# Patient Record
Sex: MALE | Race: WHITE | URBAN - METROPOLITAN AREA
[De-identification: names, ages, dates, MRNs, and addresses within clinical notes are randomized per-mention and may not be internally consistent; named-entity substitution may affect disease eponyms.]

---

## 2017-04-13 ENCOUNTER — TELEPHONE (OUTPATIENT)
Dept: OTHER | Facility: CLINIC | Age: 37
End: 2017-04-13

## 2017-04-13 NOTE — TELEPHONE ENCOUNTER
Call Regarding Onboarding [facility] choices    Attempt 1    Message     Comments: patient was very upset that we were calling him. Thinks we are a [facility]      Outreach   Allyson Zavala

## 2018-01-23 ENCOUNTER — THERAPY VISIT (OUTPATIENT)
Dept: PHYSICAL THERAPY | Facility: CLINIC | Age: 38
End: 2018-01-23
Payer: COMMERCIAL

## 2018-01-23 ENCOUNTER — OFFICE VISIT (OUTPATIENT)
Dept: FAMILY MEDICINE | Facility: CLINIC | Age: 38
End: 2018-01-23
Payer: COMMERCIAL

## 2018-01-23 ENCOUNTER — RADIANT APPOINTMENT (OUTPATIENT)
Dept: GENERAL RADIOLOGY | Facility: CLINIC | Age: 38
End: 2018-01-23
Attending: NURSE PRACTITIONER
Payer: COMMERCIAL

## 2018-01-23 VITALS
TEMPERATURE: 98.2 F | OXYGEN SATURATION: 97 % | BODY MASS INDEX: 24.85 KG/M2 | HEART RATE: 80 BPM | SYSTOLIC BLOOD PRESSURE: 143 MMHG | HEIGHT: 70 IN | RESPIRATION RATE: 16 BRPM | DIASTOLIC BLOOD PRESSURE: 83 MMHG | WEIGHT: 173.6 LBS

## 2018-01-23 DIAGNOSIS — M54.50 ACUTE RIGHT-SIDED LOW BACK PAIN WITHOUT SCIATICA: Primary | ICD-10-CM

## 2018-01-23 PROCEDURE — 72100 X-RAY EXAM L-S SPINE 2/3 VWS: CPT

## 2018-01-23 PROCEDURE — 97110 THERAPEUTIC EXERCISES: CPT | Mod: GP | Performed by: PHYSICAL THERAPIST

## 2018-01-23 PROCEDURE — 97161 PT EVAL LOW COMPLEX 20 MIN: CPT | Mod: GP | Performed by: PHYSICAL THERAPIST

## 2018-01-23 PROCEDURE — G8982 BODY POS GOAL STATUS: HCPCS | Mod: GP | Performed by: PHYSICAL THERAPIST

## 2018-01-23 PROCEDURE — 99203 OFFICE O/P NEW LOW 30 MIN: CPT | Performed by: NURSE PRACTITIONER

## 2018-01-23 PROCEDURE — G8981 BODY POS CURRENT STATUS: HCPCS | Mod: GP | Performed by: PHYSICAL THERAPIST

## 2018-01-23 ASSESSMENT — PATIENT HEALTH QUESTIONNAIRE - PHQ9: SUM OF ALL RESPONSES TO PHQ QUESTIONS 1-9: 7

## 2018-01-23 NOTE — LETTER
February 1, 2018      Sukumartayo Stephens  [address]   [facility] [address]        Dear ,    We are writing to inform you of your test results.    The radiologist did not find anything abnormal on your xrays.     I hope you are feeling better.   Please let me know if you have any questions.     Resulted Orders   XR Lumbar Spine 2/3 Views    Narrative    LUMBAR SPINE TWO TO THREE VIEWS   1/23/2018 8:28 AM     HISTORY:  Acute right-sided low back pain without sciatica.      Impression    IMPRESSION: Minimal degenerative disc disease changes at L5-S1.  Otherwise unremarkable.    ALVERTO CRUZ MD       If you have any questions or concerns, please call the clinic at the number listed above.       Sincerely,        BIJAN Gaytan CNP/nr

## 2018-01-23 NOTE — MR AVS SNAPSHOT
"              After Visit Summary   1/23/2018    Sukumar Stephens    MRN: [id number]           Patient Information     Date Of Birth          1980        Visit Information        Provider Department      1/23/2018 2:40 PM Daron Lopez, PT [facility] Physical Therapy        Today's Diagnoses     Acute right-sided low back pain without sciatica    -  1       Follow-ups after your visit        Your next 10 appointments already scheduled     Feb 08, 2018  2:00 PM CST   PRITESH Spine with [facility]   [facility] ([facility]  )    [address] [place] [id number] [phone]              Who to contact     If you have questions or need follow up information about today's clinic visit or your schedule please contact [facility] PHYSICAL THERAPY directly at [phone].  Normal or non-critical lab and imaging results will be communicated to you by [employer], letter or phone within 4 business days after the clinic has received the results. If you do not hear from us within 7 days, please contact the clinic through [employer] or phone. If you have a critical or abnormal lab result, we will notify you by phone as soon as possible.  Submit refill requests through [employer] or call your pharmacy and they will forward the refill request to us. Please allow 3 business days for your refill to be completed.          Additional Information About Your Visit        [employer] Information     Ulm[employer] lets you send messages to your doctor, view your test results, renew your prescriptions, schedule appointments and more. To sign up, go to [email or web address] . Click on \"Log in\" on the left side of the screen, which will take you to the Welcome page. Then click on \"Sign up Now\" on the right side of the page.     You will be asked to enter the access code listed below, as well as some " personal information. Please follow the directions to create your username and password.     Your access code is: [email or web address]  Expires: 2018  8:44 AM     Your access code will  in 90 days. If you need help or a new code, please call your [place] clinic or [phone].        Care EveryWhere ID     This is your Care EveryWhere ID. This could be used by other organizations to access your [place] medical records  [id number]         Blood Pressure from Last 3 Encounters:   18 143/83   13 112/72   13 120/78    Weight from Last 3 Encounters:   18 78.7 kg (173 lb 9.6 oz)   13 81 kg (178 lb 8 oz)   13 78.9 kg (174 lb)              We Performed the Following     PRITESH Inital Eval Report     PT Eval, Low Complexity (91288)     Therapeutic Exercises        Primary Care Provider Office Phone # Fax #    Wolfgang Murillo PA-C [phone]       [facility] [address] [facility] [address]        Equal Access to Services     [facility] : Hadii aad ku hadasho Soomaali, waaxda luqadaha, qaybta kaalmada adeegyada, romulo munozn reggie de la torre . So [facility] [phone].    ATENCIÓN: Si habla español, tiene a hoskins disposición servicios gratuitos de asistencia lingüística. [facility] [phone].    We comply with applicable federal civil rights laws and [place] laws. We do not discriminate on the basis of race, color, national origin, age, disability, sex, sexual orientation, or gender identity.            Thank you!     Thank you for choosing [facility] PHYSICAL THERAPY  for your care. Our goal is always to provide you with excellent care. Hearing back from our patients is one way we can continue to improve our services. Please take a few minutes to complete the written survey that you may receive in the mail after your visit with us. Thank you!             Your Updated Medication List - Protect others around  you: Learn how to safely use, store and throw away your medicines at www.disposemymeds.org.          This list is accurate as of 1/23/18 11:59 PM.  Always use your most recent med list.                   Brand Name Dispense Instructions for use Diagnosis    aspirin-acetaminophen-caffeine 250-250-65 MG per tablet    EXCEDRIN MIGRAINE     Take 1 tablet by mouth every 6 hours as needed.        buPROPion 300 MG 24 hr tablet    WELLBUTRIN XL    30 tablet    Take 1 tablet by mouth every morning.    Moderate major depression (H)       Calcium-Magnesium-Zinc Tabs      Take  by mouth. 1 tablet twice daily    Moderate major depression (H)       cetirizine 10 MG tablet    zyrTEC     Take 10 mg by mouth daily as needed. Takes regularly from July to September when allergies are bad        diphenhydrAMINE-acetaminophen  MG tablet    TYLENOL PM     Take 2 tablets by mouth nightly as needed.        MULTIVITAMIN & MINERAL PO      Take  by mouth. 1 tablet twice daily    Moderate major depression (H)       OMEGA 3 PO      Take  by mouth. 1 tablet po daily    CARDIOVASCULAR SCREENING; LDL GOAL LESS THAN 160       PSEUDOEPHEDRINE HCL PO      Take 60 mg by mouth 2 times daily as needed.        traZODone 50 MG tablet    DESYREL    30 tablet    Take 1-2 tablets by mouth nightly as needed for sleep.    Insomnia       triamcinolone 0.1 % cream    KENALOG    30 g    Apply to affected area twice a  day.  Do not use for more than 10 days at one time.  Stop using after 10 days. Apply sparingly to affected area.    Seborrheic dermatitis       TYLENOL PO      Take 1-2 tablets by mouth every 8 hours as needed.        VITAMIN B COMPLEX PO      Take  by mouth. 1 tablet daily    Moderate major depression (H)       VITAMIN C PO      Take  by mouth. 2 tablets daily    Moderate major depression (H)       VITAMIN D (CHOLECALCIFEROL) PO      Take  by mouth daily. 1 tablet daily    Moderate major depression (H)

## 2018-01-23 NOTE — MR AVS SNAPSHOT
After Visit Summary   1/23/2018    Sukumar Stephens    MRN: [id number]           Patient Information     Date Of Birth          1980        Visit Information        Provider Department      1/23/2018 7:40 AM Shavonne Mckenzie APRN CNP [facility]        Today's Diagnoses     Acute right-sided low back pain without sciatica    -  1       Follow-ups after your visit        Additional Services     PRITESH PT, HAND, AND CHIROPRACTIC REFERRAL       **This order will print in the [facility] Scheduling Office**    Physical Therapy, Hand Therapy and Chiropractic Care are available through:    *[facility]  *[facility]  *[facility]    Call one number to schedule at any of the above locations: [phone].    Your provider has referred you to: Physical Therapy at [facility] or [facility]    Indication/Reason for Referral: Low Back Pain  Onset of Illness: years  Therapy Orders: Evaluate and Treat  Special Programs: None  Special Request: None    May Olvera      Additional Comments for the Therapist or Chiropractor: leg length discrepancy causing low back pain    Please be aware that coverage of these services is subject to the terms and limitations of your health insurance plan.  Call member services at your health plan with any benefit or coverage questions.      Please bring the following to your appointment:    *Your personal calendar for scheduling future appointments  *Comfortable clothing                  Who to contact     If you have questions or need follow up information about today's clinic visit or your schedule please contact [facility] directly at [phone].  Normal or non-critical lab and imaging results will be communicated to you by MyChart, letter or phone within 4 business days after the clinic has received the results. If you do not hear from us within 7 days, please contact the clinic through [employer] or  "phone. If you have a critical or abnormal lab result, we will notify you by phone as soon as possible.  Submit refill requests through [employer] or call your pharmacy and they will forward the refill request to us. Please allow 3 business days for your refill to be completed.          Additional Information About Your Visit        [employer] Information     [employer] lets you send messages to your doctor, view your test results, renew your prescriptions, schedule appointments and more. To sign up, go to [email or web address] . Click on \"Log in\" on the left side of the screen, which will take you to the Welcome page. Then click on \"Sign up Now\" on the right side of the page.     You will be asked to enter the access code listed below, as well as some personal information. Please follow the directions to create your username and password.     Your access code is: [email or web address]  Expires: 2018  8:44 AM     Your access code will  in 90 days. If you need help or a new code, please call your [place] clinic or [phone].        Care EveryWhere ID     This is your Care EveryWhere ID. This could be used by other organizations to access your [place] medical records  [id number]        Your Vitals Were     Pulse Temperature Respirations Height Pulse Oximetry BMI (Body Mass Index)    80 98.2  F (36.8  C) (Oral) 16 5' 10\" (1.778 m) 97% 24.91 kg/m2       Blood Pressure from Last 3 Encounters:   18 143/83   13 112/72   13 120/78    Weight from Last 3 Encounters:   18 173 lb 9.6 oz (78.7 kg)   13 178 lb 8 oz (81 kg)   13 174 lb (78.9 kg)              We Performed the Following     PRITESH PT, HAND, AND CHIROPRACTIC REFERRAL     XR Lumbar Spine 2/3 Views        Primary Care Provider Office Phone # Fax #    Wolfgang Murillo PA-C [phone]       [facility] [address] [facility] [address]        Equal Access to Services     NEPTALI MARCH AH: Saul hofmfan " Sobenjamín, wasatishda luqadaha, qaybta kaalmada nani, romulo paredes gloriaoneil juaressharif deirdre. So [facility] [phone].    ATENCIÓN: Si jose vang, tiene a hoskins disposición servicios gratuitos de asistencia lingüística. Tana al [phone].    We comply with applicable federal civil rights laws and [place] laws. We do not discriminate on the basis of race, color, national origin, age, disability, sex, sexual orientation, or gender identity.            Thank you!     Thank you for choosing [facility]  for your care. Our goal is always to provide you with excellent care. Hearing back from our patients is one way we can continue to improve our services. Please take a few minutes to complete the written survey that you may receive in the mail after your visit with us. Thank you!             Your Updated Medication List - Protect others around you: Learn how to safely use, store and throw away your medicines at www.disposemymeds.org.          This list is accurate as of: 1/23/18  8:44 AM.  Always use your most recent med list.                   Brand Name Dispense Instructions for use Diagnosis    aspirin-acetaminophen-caffeine 250-250-65 MG per tablet    EXCEDRIN MIGRAINE     Take 1 tablet by mouth every 6 hours as needed.        buPROPion 300 MG 24 hr tablet    WELLBUTRIN XL    30 tablet    Take 1 tablet by mouth every morning.    Moderate major depression (H)       Calcium-Magnesium-Zinc Tabs      Take  by mouth. 1 tablet twice daily    Moderate major depression (H)       cetirizine 10 MG tablet    zyrTEC     Take 10 mg by mouth daily as needed. Takes regularly from July to September when allergies are bad        diphenhydrAMINE-acetaminophen  MG tablet    TYLENOL PM     Take 2 tablets by mouth nightly as needed.        MULTIVITAMIN & MINERAL PO      Take  by mouth. 1 tablet twice daily    Moderate major depression (H)       OMEGA 3 PO      Take  by mouth. 1 tablet po daily     CARDIOVASCULAR SCREENING; LDL GOAL LESS THAN 160       PSEUDOEPHEDRINE HCL PO      Take 60 mg by mouth 2 times daily as needed.        traZODone 50 MG tablet    DESYREL    30 tablet    Take 1-2 tablets by mouth nightly as needed for sleep.    Insomnia       triamcinolone 0.1 % cream    KENALOG    30 g    Apply to affected area twice a  day.  Do not use for more than 10 days at one time.  Stop using after 10 days. Apply sparingly to affected area.    Seborrheic dermatitis       TYLENOL PO      Take 1-2 tablets by mouth every 8 hours as needed.        VITAMIN B COMPLEX PO      Take  by mouth. 1 tablet daily    Moderate major depression (H)       VITAMIN C PO      Take  by mouth. 2 tablets daily    Moderate major depression (H)       VITAMIN D (CHOLECALCIFEROL) PO      Take  by mouth daily. 1 tablet daily    Moderate major depression (H)

## 2018-01-23 NOTE — PROGRESS NOTES
SUBJECTIVE:   Sukumar Stephens is a 37 year old male who presents to clinic today for the following health issues:        Back Pain       Duration: 2 weeks        Specific cause: none    Description:   Location of pain: low back right  Character of pain: dull ache  Pain radiation:none  New numbness or weakness in legs, not attributed to pain:  no     Intensity: Currently 3/10    History:   Pain interferes with job: YES  History of back problems: no prior back problems  Therapies tried without relief: Ibuprofen    Alleviating factors:   Improved by: ibuprofen       Precipitating factors:  Worsened by: Sitting    Accompanying Signs & Symptoms:  Risk of Fracture:  None  Risk of Cauda Equina:  None  Risk of Infection:  None  Risk of Cancer:  None  Risk of Ankylosing Spondylitis:  Onset at age <35, male, AND morning back stiffness. no     Red flag symptoms: negative.  Has known leg length descrepancy - normally wears an orthotic on the right shoe.  Typically this can cause left low back pain and he regularly does his stretches to prevent this.    This right sided pain is different than his normal low back pain.      Past Medical History:   Diagnosis Date     CARDIOVASCULAR SCREENING; LDL GOAL LESS THAN 160      Insomnia 4/12/2013     Moderate major depression (H) 4/12/2013     Substance abuse 10/27/2012    Alcohol - Quit drinking      Current Outpatient Prescriptions   Medication Sig Dispense Refill     cetirizine (ZYRTEC) 10 MG tablet Take 10 mg by mouth daily as needed. Takes regularly from July to September when allergies are bad       buPROPion (WELLBUTRIN XL) 300 MG 24 hr tablet Take 1 tablet by mouth every morning. (Patient not taking: Reported on 1/23/2018) 30 tablet 6     triamcinolone (KENALOG) 0.1 % cream Apply to affected area twice a  day.  Do not use for more than 10 days at one time.  Stop using after 10 days.  Apply sparingly to affected area. (Patient not taking: Reported on 1/23/2018) 30 g 0     B  "Complex Vitamins (VITAMIN B COMPLEX PO) Take  by mouth. 1 tablet daily       Multiple Minerals-Vitamins (CALCIUM-MAGNESIUM-ZINC) TABS Take  by mouth. 1 tablet twice daily       Multiple Vitamins-Minerals (MULTIVITAMIN & MINERAL PO) Take  by mouth. 1 tablet twice daily       Ascorbic Acid (VITAMIN C PO) Take  by mouth. 2 tablets daily       Omega-3 Fatty Acids (OMEGA 3 PO) Take  by mouth. 1 tablet po daily       VITAMIN D, CHOLECALCIFEROL, PO Take  by mouth daily. 1 tablet daily       traZODone (DESYREL) 50 MG tablet Take 1-2 tablets by mouth nightly as needed for sleep. (Patient not taking: Reported on 1/23/2018) 30 tablet 6     diphenhydrAMINE-acetaminophen (TYLENOL PM)  MG tablet Take 2 tablets by mouth nightly as needed.       PSEUDOEPHEDRINE HCL PO Take 60 mg by mouth 2 times daily as needed.       aspirin-acetaminophen-caffeine (EXCEDRIN MIGRAINE) 250-250-65 MG per tablet Take 1 tablet by mouth every 6 hours as needed.       Acetaminophen (TYLENOL PO) Take 1-2 tablets by mouth every 8 hours as needed.       Social History   Substance Use Topics     Smoking status: Never Smoker     Smokeless tobacco: Never Used     Alcohol use No      Comment: Pt states drinks 2-3 liters per week stopped drinkin in Octoberg this past Sunday.       ROS:  Review of systems negative except as stated above.    OBJECTIVE:  /83  Pulse 80  Temp 98.2  F (36.8  C) (Oral)  Resp 16  Ht 5' 10\" (1.778 m)  Wt 173 lb 9.6 oz (78.7 kg)  SpO2 97%  BMI 24.91 kg/m2  Back examination: Back symmetric, no curvature. ROM normal. No CVA tenderness.  [unfilled] leg raise test: negative  GENERAL APPEARANCE: healthy, alert and no distress  RESP: lungs clear to auscultation - no rales, rhonchi or wheezes  CV: regular rates and rhythm, normal S1 S2, no murmur noted  ABDOMEN:  soft, nontender, no HSM or masses and bowel sounds normal  NEURO: Normal strength and tone with no weakness or sensory deficit noted, reflexes normal   SKIN: no " suspicious lesions or rashes    Xray interpreted as negative in the clinic.  Pending radiologist review.        ASSESSMENT/IMPRESSION:  myofascial low back strain    PLAN:1) PLEASE SEE ORDER SUMMARY  [unfilled]:  Refer to PT for target exercises.    Recommend he get refitted for orthotics      1.  Continue stretching and strengthening exercises.       2.  Continue prn heat or ice application.

## 2018-01-24 PROBLEM — M54.50 ACUTE RIGHT-SIDED LOW BACK PAIN WITHOUT SCIATICA: Status: ACTIVE | Noted: 2018-01-24

## 2018-02-08 ENCOUNTER — THERAPY VISIT (OUTPATIENT)
Dept: PHYSICAL THERAPY | Facility: CLINIC | Age: 38
End: 2018-02-08
Payer: COMMERCIAL

## 2018-02-08 DIAGNOSIS — M54.50 ACUTE RIGHT-SIDED LOW BACK PAIN WITHOUT SCIATICA: ICD-10-CM

## 2018-02-08 PROCEDURE — 97112 NEUROMUSCULAR REEDUCATION: CPT | Mod: GP | Performed by: PHYSICAL THERAPIST

## 2018-02-08 PROCEDURE — 97140 MANUAL THERAPY 1/> REGIONS: CPT | Mod: GP | Performed by: PHYSICAL THERAPIST

## 2018-02-08 PROCEDURE — 97110 THERAPEUTIC EXERCISES: CPT | Mod: GP | Performed by: PHYSICAL THERAPIST

## 2018-02-22 ENCOUNTER — THERAPY VISIT (OUTPATIENT)
Dept: PHYSICAL THERAPY | Facility: CLINIC | Age: 38
End: 2018-02-22
Payer: COMMERCIAL

## 2018-02-22 DIAGNOSIS — M54.50 ACUTE RIGHT-SIDED LOW BACK PAIN WITHOUT SCIATICA: ICD-10-CM

## 2018-02-22 PROCEDURE — 97140 MANUAL THERAPY 1/> REGIONS: CPT | Mod: GP | Performed by: PHYSICAL THERAPIST

## 2018-02-22 PROCEDURE — 97112 NEUROMUSCULAR REEDUCATION: CPT | Mod: GP | Performed by: PHYSICAL THERAPIST

## 2018-02-22 PROCEDURE — 97110 THERAPEUTIC EXERCISES: CPT | Mod: GP | Performed by: PHYSICAL THERAPIST

## 2018-03-08 ENCOUNTER — THERAPY VISIT (OUTPATIENT)
Dept: PHYSICAL THERAPY | Facility: CLINIC | Age: 38
End: 2018-03-08
Payer: COMMERCIAL

## 2018-03-08 DIAGNOSIS — M54.50 ACUTE RIGHT-SIDED LOW BACK PAIN WITHOUT SCIATICA: ICD-10-CM

## 2018-03-08 PROCEDURE — 97110 THERAPEUTIC EXERCISES: CPT | Mod: GP | Performed by: PHYSICAL THERAPIST

## 2018-03-08 PROCEDURE — 97112 NEUROMUSCULAR REEDUCATION: CPT | Mod: GP | Performed by: PHYSICAL THERAPIST

## 2018-03-08 PROCEDURE — 97140 MANUAL THERAPY 1/> REGIONS: CPT | Mod: GP | Performed by: PHYSICAL THERAPIST

## 2018-09-26 ENCOUNTER — OFFICE VISIT (OUTPATIENT)
Dept: FAMILY MEDICINE | Facility: CLINIC | Age: 38
End: 2018-09-26
Payer: OTHER MISCELLANEOUS

## 2018-09-26 VITALS
SYSTOLIC BLOOD PRESSURE: 136 MMHG | BODY MASS INDEX: 24.34 KG/M2 | DIASTOLIC BLOOD PRESSURE: 88 MMHG | HEIGHT: 70 IN | RESPIRATION RATE: 14 BRPM | WEIGHT: 170 LBS | OXYGEN SATURATION: 98 % | HEART RATE: 68 BPM | TEMPERATURE: 98.3 F

## 2018-09-26 DIAGNOSIS — Z23 NEED FOR PROPHYLACTIC VACCINATION AND INOCULATION AGAINST INFLUENZA: ICD-10-CM

## 2018-09-26 DIAGNOSIS — T14.8XXA ABRASION OF SKIN: Primary | ICD-10-CM

## 2018-09-26 PROCEDURE — 90715 TDAP VACCINE 7 YRS/> IM: CPT | Performed by: FAMILY MEDICINE

## 2018-09-26 PROCEDURE — 99213 OFFICE O/P EST LOW 20 MIN: CPT | Mod: 25 | Performed by: FAMILY MEDICINE

## 2018-09-26 PROCEDURE — 90471 IMMUNIZATION ADMIN: CPT | Performed by: FAMILY MEDICINE

## 2018-09-26 PROCEDURE — 90686 IIV4 VACC NO PRSV 0.5 ML IM: CPT | Performed by: FAMILY MEDICINE

## 2018-09-26 PROCEDURE — 90472 IMMUNIZATION ADMIN EACH ADD: CPT | Performed by: FAMILY MEDICINE

## 2018-09-26 RX ORDER — CEPHALEXIN 500 MG/1
500 CAPSULE ORAL 3 TIMES DAILY
Qty: 9 CAPSULE | Refills: 0 | Status: SHIPPED | OUTPATIENT
Start: 2018-09-26 | End: 2018-09-29

## 2018-09-26 NOTE — PROGRESS NOTES
"  SUBJECTIVE:   Sukumar tSephens is a 38 year old male who presents to clinic today for the following health issues:      WOUND CARE/LACERATION    Onset: 3 hour(s) ago     Description:   Location: right fore arm-extensor carpi ulnaris muscle area. Length 5\", superficial     History of how wound occured:    Changing air conditioner. Corby metal and unsanitized water present.    Current dressing/therapies tried :   Patient cleaned laceration with alcohol and dressed wit band-aids    Accompanying Signs and Symptoms:        Actively bleeding: no        Redness: YES        Warmth: no        Drainage: no        Able to move area: YES        Numbness and/or tingling: no        Fevers: no    Wound Care needs today:         Wound care: removal of existing dressing  visual inspection  cleansing with Exidine -4 CHG 4% solution  application of sterile dressing          Tetanus vaccination status reviewed:  Td vaccination indicated and given today.    Injured at work, scraped with air conditioner with stagnant and dirty water.         EXAM:  /88 (BP Location: Left arm, Patient Position: Sitting)  Pulse 68  Temp 98.3  F (36.8  C) (Oral)  Resp 14  Ht 5' 10\" (1.778 m)  Wt 170 lb (77.1 kg)  SpO2 98%  BMI 24.39 kg/m2  Constitutional: Healthy, alert, no distress   Skin: approx 20cm superficial abrasion with some surrounding erythema, minimal tenderness    ASSESSMENT    ICD-10-CM    1. Abrasion of skin T14.8XXA cephALEXin (KEFLEX) 500 MG capsule     TDAP, IM (10 - 64 YRS) - Adacel     Vaccine Administration, Each Additional [63543]   2. Need for prophylactic vaccination and inoculation against influenza Z23 FLU VACCINE, SPLIT VIRUS, IM (QUADRIVALENT) [41447]- >3 YRS     Vaccine Administration, Initial [92139]      Plan:  Recommending antibiotics given description of mechanism of injury with dirty water.    Follow-up in 1 week to resolve this work related encounter if necessary from a work comp perspective, sooner if not " resolving or if redness, drainage develops. Keep clean and dry.    Ang Jacobs MD  Family Medicine Physician

## 2018-09-26 NOTE — NURSING NOTE
Wound Care needs today:         Wound care: removal of existing dressing  visual inspection  cleansing with Exidine -4 CHG 4% solution  application of sterile dressing   Wound care instructions given.      Frank Do MA on 9/26/2018 at 12:43 PM

## 2018-09-26 NOTE — NURSING NOTE
Prior to injection verified patient identity using patient's name and date of birth.  Due to injection administration, patient instructed to remain in clinic for 15 minutes  afterwards, and to report any adverse reaction to me immediately. Vaccine information supplied.      Injectable Influenza Immunization Documentation    1.  Is the person to be vaccinated sick today?   No    2. Does the person to be vaccinated have an allergy to a component   of the vaccine?   No  Egg Allergy Algorithm Link    3. Has the person to be vaccinated ever had a serious reaction   to influenza vaccine in the past?   No    4. Has the person to be vaccinated ever had Guillain-Barré syndrome?   No    Form completed by Frank Kohli      Prior to injection verified patient identity using patient's name and date of birth.  Due to injection administration, patient instructed to remain in clinic for 15 minutes  afterwards, and to report any adverse reaction to me immediately. Vaccine information supplied.  Screening Questionnaire for Adult Immunization    Are you sick today?   No   Do you have allergies to medications, food, a vaccine component or latex?   No   Have you ever had a serious reaction after receiving a vaccination?   No   Do you have a long-term health problem with heart disease, lung disease, asthma, kidney disease, metabolic disease (e.g. diabetes), anemia, or other blood disorder?   No   Do you have cancer, leukemia, HIV/AIDS, or any other immune system problem?   No   In the past 3 months, have you taken medications that affect  your immune system, such as prednisone, other steroids, or anticancer drugs; drugs for the treatment of rheumatoid arthritis, Crohn s disease, or psoriasis; or have you had radiation treatments?   No   Have you had a seizure, or a brain or other nervous system problem?   No   During the past year, have you received a transfusion of blood or blood     products, or been given immune (gamma) globulin or  antiviral drug?   No   For women: Are you pregnant or is there a chance you could become        pregnant during the next month?   No   Have you received any vaccinations in the past 4 weeks?   No     Immunization questionnaire answers were all negative.        Per orders of Dr. Usama Jacobs, injection of Adacel given by Frank Do. Patient instructed to remain in clinic for 15 minutes afterwards, and to report any adverse reaction to me immediately.       Screening performed by Frank Do on 9/26/2018 at 12:39 PM.

## 2018-09-26 NOTE — MR AVS SNAPSHOT
"              After Visit Summary   9/26/2018    Sukumar Stephens    MRN: [id number]           Patient Information     Date Of Birth          1980        Visit Information        Provider Department      9/26/2018 11:30 AM Ang Burgos MD [facility]        Today's Diagnoses     Abrasion of skin    -  1    Need for prophylactic vaccination and inoculation against influenza           Follow-ups after your visit        Your next 10 appointments already scheduled     Oct 10, 2018  4:00 PM CDT   Office Visit with Ang Jacobs MD   [facility] ([facility])    [address] [place] [id number] [phone]           Bring a current list of meds and any records pertaining to this visit. For Physicals, please bring immunization records and any forms needing to be filled out. Please arrive 10 minutes early to complete paperwork.              Who to contact     If you have questions or need follow up information about today's clinic visit or your schedule please contact [facility] directly at [phone].  Normal or non-critical lab and imaging results will be communicated to you by Egoscuehart, letter or phone within 4 business days after the clinic has received the results. If you do not hear from us within 7 days, please contact the clinic through [employer] or phone. If you have a critical or abnormal lab result, we will notify you by phone as soon as possible.  Submit refill requests through [employer] or call your pharmacy and they will forward the refill request to us. Please allow 3 business days for your refill to be completed.          Additional Information About Your Visit        MyChart Information     [employer] lets you send messages to your doctor, view your test results, renew your prescriptions, schedule appointments and more. To sign up, go to [email or web address] . Click on \"Log in\" on the " "left side of the screen, which will take you to the Welcome page. Then click on \"Sign up Now\" on the right side of the page.     You will be asked to enter the access code listed below, as well as some personal information. Please follow the directions to create your username and password.     Your access code is: [email or web address]  Expires: 2018  2:27 PM     Your access code will  in 90 days. If you need help or a new code, please call your [facility] clinic or [phone].        Care EveryWhere ID     This is your Care EveryWhere ID. This could be used by other organizations to access your [facility] medical records  [id number]        Your Vitals Were     Pulse Temperature Respirations Height Pulse Oximetry BMI (Body Mass Index)    68 98.3  F (36.8  C) (Oral) 14 5' 10\" (1.778 m) 98% 24.39 kg/m2       Blood Pressure from Last 3 Encounters:   18 136/88   18 143/83   13 112/72    Weight from Last 3 Encounters:   18 170 lb (77.1 kg)   18 173 lb 9.6 oz (78.7 kg)   13 178 lb 8 oz (81 kg)              We Performed the Following     FLU VACCINE, SPLIT VIRUS, IM (QUADRIVALENT) [79113]- >3 YRS     TDAP, IM (10 - 64 YRS) - Adacel     Vaccine Administration, Each Additional [05087]     Vaccine Administration, Initial [34731]          Today's Medication Changes          These changes are accurate as of 18  2:27 PM.  If you have any questions, ask your nurse or doctor.               Start taking these medicines.        Dose/Directions    cephALEXin 500 MG capsule   Commonly known as:  KEFLEX   Used for:  Abrasion of skin   Started by:  Ang Burgos MD        Dose:  500 mg   Take 1 capsule (500 mg) by mouth 3 times daily for 3 days   Quantity:  9 capsule   Refills:  0            Where to get your medicines      These medications were sent to [facility] - [place], [place] - [address] [facility] [address]  [address], [place] [address]     Phone:  " [phone]     cephALEXin 500 MG capsule                Primary Care Provider Office Phone # Fax #    Wolfgang Murillo PA-C [phone]       [facility] [address]  [facility] [address]        Equal Access to Services     NEPTALI MARCH : Hadii eric ku hadasho Soomaali, waaxda luqadaha, qaybta kaalmada adeegyada, waxay divinein haykrishnan reggie castromary janelia gilmore. So [facility] [phone].    ATENCIÓN: Si habla español, tiene a hoskins disposición servicios gratuitos de asistencia lingüística. Llame al [phone].    We comply with applicable federal civil rights laws and [place] laws. We do not discriminate on the basis of race, color, national origin, age, disability, sex, sexual orientation, or gender identity.            Thank you!     Thank you for choosing [facility]  for your care. Our goal is always to provide you with excellent care. Hearing back from our patients is one way we can continue to improve our services. Please take a few minutes to complete the written survey that you may receive in the mail after your visit with us. Thank you!             Your Updated Medication List - Protect others around you: Learn how to safely use, store and throw away your medicines at www.disposemymeds.org.          This list is accurate as of 9/26/18  2:27 PM.  Always use your most recent med list.                   Brand Name Dispense Instructions for use Diagnosis    aspirin-acetaminophen-caffeine 250-250-65 MG per tablet    EXCEDRIN MIGRAINE     Take 1 tablet by mouth every 6 hours as needed.        buPROPion 300 MG 24 hr tablet    WELLBUTRIN XL    30 tablet    Take 1 tablet by mouth every morning.    Moderate major depression (H)       Calcium-Magnesium-Zinc Tabs      Take  by mouth. 1 tablet twice daily    Moderate major depression (H)       cephALEXin 500 MG capsule    KEFLEX    9 capsule    Take 1 capsule (500 mg) by mouth 3 times daily for 3 days    Abrasion of skin        cetirizine 10 MG tablet    zyrTEC     Take 10 mg by mouth daily as needed. Takes regularly from July to September when allergies are bad        diphenhydrAMINE-acetaminophen  MG tablet    TYLENOL PM     Take 2 tablets by mouth nightly as needed.        MULTIVITAMIN & MINERAL PO      Take  by mouth. 1 tablet twice daily    Moderate major depression (H)       OMEGA 3 PO      Take  by mouth. 1 tablet po daily    CARDIOVASCULAR SCREENING; LDL GOAL LESS THAN 160       PSEUDOEPHEDRINE HCL PO      Take 60 mg by mouth 2 times daily as needed.        traZODone 50 MG tablet    DESYREL    30 tablet    Take 1-2 tablets by mouth nightly as needed for sleep.    Insomnia       triamcinolone 0.1 % cream    KENALOG    30 g    Apply to affected area twice a  day.  Do not use for more than 10 days at one time.  Stop using after 10 days. Apply sparingly to affected area.    Seborrheic dermatitis       TYLENOL PO      Take 1-2 tablets by mouth every 8 hours as needed.        VITAMIN B COMPLEX PO      Take  by mouth. 1 tablet daily    Moderate major depression (H)       VITAMIN C PO      Take  by mouth. 2 tablets daily    Moderate major depression (H)       VITAMIN D (CHOLECALCIFEROL) PO      Take  by mouth daily. 1 tablet daily    Moderate major depression (H)

## 2018-09-26 NOTE — LETTER
My Depression Action Plan  Name: Sukumar Stephens   Date of Birth 1980  Date: 9/26/2018    My doctor: Wolfgang Murillo   My clinic: [address] [id number] [phone]          GREEN    ZONE   Good Control    What it looks like:     Things are going generally well. You have normal up s and down s. You may even feel depressed from time to time, but bad moods usually last less than a day.   What you need to do:  1. Continue to care for yourself (see self care plan)  2. Check your depression survival kit and update it as needed  3. Follow your physician s recommendations including any medication.  4. Do not stop taking medication unless you consult with your physician first.           YELLOW         ZONE Getting Worse    What it looks like:     Depression is starting to interfere with your life.     It may be hard to get out of bed; you may be starting to isolate yourself from others.    Symptoms of depression are starting to last most all day and this has happened for several days.     You may have suicidal thoughts but they are not constant.   What you need to do:     1. Call your care team, your response to treatment will improve if you keep your care team informed of your progress. Yellow periods are signs an adjustment may need to be made.     2. Continue your self-care, even if you have to fake it!    3. Talk to someone in your support network    4. Open up your depression survival kit           RED    ZONE Medical Alert - Get Help    What it looks like:     Depression is seriously interfering with your life.     You may experience these or other symptoms: You can t get out of bed most days, can t work or engage in other necessary activities, you have trouble taking care of basic hygiene, or basic responsibilities, thoughts of suicide or death that will not go away, self-injurious behavior.     What you need to do:  1. Call your care team and  request a same-day appointment. If they are not available (weekends or after hours) call your local crisis line, emergency room or 911.            Depression Self Care Plan / Survival Kit    Self-Care for Depression  Here s the deal. Your body and mind are really not as separate as most people think.  What you do and think affects how you feel and how you feel influences what you do and think. This means if you do things that people who feel good do, it will help you feel better.  Sometimes this is all it takes.  There is also a place for medication and therapy depending on how severe your depression is, so be sure to consult with your medical provider and/ or Behavioral Health Consultant if your symptoms are worsening or not improving.     In order to better manage my stress, I will:    Exercise  Get some form of exercise, every day. This will help reduce pain and release endorphins, the  feel good  chemicals in your brain. This is almost as good as taking antidepressants!  This is not the same as joining a gym and then never going! (they count on that by the way ) It can be as simple as just going for a walk or doing some gardening, anything that will get you moving.      Hygiene   Maintain good hygiene (Get out of bed in the morning, Make your bed, Brush your teeth, Take a shower, and Get dressed like you were going to work, even if you are unemployed).  If your clothes don't fit try to get ones that do.    Diet  I will strive to eat foods that are good for me, drink plenty of water, and avoid excessive sugar, caffeine, alcohol, and other mood-altering substances.  Some foods that are helpful in depression are: complex carbohydrates, B vitamins, flaxseed, fish or fish oil, fresh fruits and vegetables.    Psychotherapy  I agree to participate in Individual Therapy (if recommended).    Medication  If prescribed medications, I agree to take them.  Missing doses can result in serious side effects.  I understand that  drinking alcohol, or other illicit drug use, may cause potential side effects.  I will not stop my medication abruptly without first discussing it with my provider.    Staying Connected With Others  I will stay in touch with my friends, family members, and my primary care provider/team.    Use your imagination  Be creative.  We all have a creative side; it doesn t matter if it s oil painting, sand castles, or mud pies! This will also kick up the endorphins.    Witness Beauty  (AKA stop and smell the roses) Take a look outside, even in mid-winter. Notice colors, textures. Watch the squirrels and birds.     Service to others  Be of service to others.  There is always someone else in need.  By helping others we can  get out of ourselves  and remember the really important things.  This also provides opportunities for practicing all the other parts of the program.    Humor  Laugh and be silly!  Adjust your TV habits for less news and crime-drama and more comedy.    Control your stress  Try breathing deep, massage therapy, biofeedback, and meditation. Find time to relax each day.     My support system    Clinic Contact:  Phone number:    Contact 1:  Phone number:    Contact 2:  Phone number:    Denominational/:  Phone number:    Therapist:  Phone number:    Local crisis center:    Phone number:    Other community support:  Phone number:

## 2018-09-27 ASSESSMENT — PATIENT HEALTH QUESTIONNAIRE - PHQ9: SUM OF ALL RESPONSES TO PHQ QUESTIONS 1-9: 2
